# Patient Record
Sex: FEMALE | Race: BLACK OR AFRICAN AMERICAN | URBAN - METROPOLITAN AREA
[De-identification: names, ages, dates, MRNs, and addresses within clinical notes are randomized per-mention and may not be internally consistent; named-entity substitution may affect disease eponyms.]

---

## 2018-01-16 ENCOUNTER — ALLSCRIPTS OFFICE VISIT (OUTPATIENT)
Dept: OTHER | Facility: OTHER | Age: 58
End: 2018-01-16

## 2018-01-17 NOTE — CONSULTS
Assessment   1  Non-smoker (V49 89) (Z78 9)   2  Difficulty in walking (719 7) (R26 2)   3  Closed fracture dislocation of toe of left foot, initial encounter (826 0) (I64 651O)    Plan    · Follow-up visit in 2 weeks Evaluation and Treatment  Follow-up  Status: Hold For -    Scheduling  Requested for: [id number]    Discussion/Summary      [place] splinting ice and elevation  Patient says she has of the take NSAIDs  Has stomach problems  Does take morphine for chronic back pain  Patient had previous cervical spinal fusion has attained joyce was done little over year ago  Discussed with patient injection or physical therapy not resolving  Discussed importance of 1 shoes discussed padding  Patient to call if any increasing pain  The patient was counseled regarding instructions for management,-- patient and family education,-- importance of compliance with treatment  Chief Complaint   11/2017 I got a fracture of left foot  Left foot is swollen  fractured left 5th digit in November  Patient had recent x-ray brought in with her  Patient has some pain and swelling in the toe  Pain when wearing shoes  Patient has not noticed any redness or signs of infection  Patient rates pain 5/10 pain scale  Social History    · Non-smoker (V49 89) (Z78 9)    Current Meds    1  No Reported Medications Recorded     The medication list was reviewed and updated today  Allergies   1  No Known Drug Allergies    Vitals    Recorded: [id number] 01:07PM   Height 5 ft 4 in   Weight 130 lb    BMI Calculated 22 31   BSA Calculated 1 63     Physical Exam   Left Foot: Appearance: Normal except as noted: excessive pronation-- and-- pes planus  Left 5th digit is clinically straight  There is moderate swelling  There is no redness no signs of infection  There is mild pain on range of motion  Right Foot: Appearance: Normal except as noted: excessive pronation-- and-- pes planus  Neurological Exam: performed   Light touch was intact bilaterally  Vibratory sensation was intact bilaterally  Vascular Exam: performed Dorsalis pedis pulses were 2/4 bilaterally  Posterior tibial pulses were 1/4 bilaterally  Capillary refill time was between 1-3 seconds bilaterally  Results/Data   I personally reviewed the films/images/results in the office today  My interpretation follows  X-ray Review X-ray reviewed right foot discussed with patient  Discussed that there is some lucency of the lateral aspect of the distal phalanx possible fracture  There is no fracture in the proximal phalanx the there is no dislocation toes clinically rectus  Procedure   sandra splinting applied to left 5th digit patient instructed on sandra splinting  Signatures    Electronically signed by :  Morena Gardner DPM; Jan 16 2018  1:27PM EST                       (Author)

## 2018-01-23 VITALS — WEIGHT: 130 LBS | HEIGHT: 64 IN | BODY MASS INDEX: 22.2 KG/M2

## 2018-03-14 ENCOUNTER — OFFICE VISIT (OUTPATIENT)
Dept: PODIATRY | Facility: CLINIC | Age: 58
End: 2018-03-14
Payer: COMMERCIAL

## 2018-03-14 VITALS
SYSTOLIC BLOOD PRESSURE: 126 MMHG | DIASTOLIC BLOOD PRESSURE: 76 MMHG | BODY MASS INDEX: 21.85 KG/M2 | HEIGHT: 64 IN | WEIGHT: 128 LBS | RESPIRATION RATE: 16 BRPM | HEART RATE: 80 BPM

## 2018-03-14 DIAGNOSIS — M79.671 PAIN IN BOTH FEET: ICD-10-CM

## 2018-03-14 DIAGNOSIS — M20.42 HAMMER TOE OF LEFT FOOT: ICD-10-CM

## 2018-03-14 DIAGNOSIS — B35.1 ONYCHOMYCOSIS: ICD-10-CM

## 2018-03-14 DIAGNOSIS — M21.969 ACQUIRED DEFORMITY OF FOOT, UNSPECIFIED LATERALITY: Primary | ICD-10-CM

## 2018-03-14 DIAGNOSIS — M79.672 PAIN IN BOTH FEET: ICD-10-CM

## 2018-03-14 PROCEDURE — 73620 X-RAY EXAM OF FOOT: CPT | Performed by: PODIATRIST

## 2018-03-14 PROCEDURE — 99213 OFFICE O/P EST LOW 20 MIN: CPT | Performed by: PODIATRIST

## 2018-03-14 RX ORDER — LEVOTHYROXINE SODIUM 0.05 MG/1
50 TABLET ORAL DAILY
COMMUNITY

## 2018-03-14 RX ORDER — PREGABALIN 100 MG/1
50 CAPSULE ORAL 3 TIMES DAILY
COMMUNITY

## 2018-03-14 RX ORDER — MORPHINE SULFATE 15 MG/1
15 TABLET ORAL EVERY 4 HOURS PRN
COMMUNITY

## 2018-03-14 RX ORDER — ESTRADIOL 0.03 MG/D
1 FILM, EXTENDED RELEASE TRANSDERMAL 2 TIMES WEEKLY
COMMUNITY

## 2018-03-14 RX ORDER — ESTRADIOL 0.07 MG/D
PATCH TRANSDERMAL
COMMUNITY
Start: 2018-01-30

## 2018-03-14 RX ORDER — TERBINAFINE HYDROCHLORIDE 250 MG/1
250 TABLET ORAL DAILY
Qty: 30 TABLET | Refills: 0 | Status: SHIPPED | OUTPATIENT
Start: 2018-03-14 | End: 2018-04-13

## 2018-03-14 RX ORDER — RANITIDINE 150 MG/1
150 CAPSULE ORAL 2 TIMES DAILY
COMMUNITY

## 2018-03-14 RX ORDER — DEXLANSOPRAZOLE 60 MG/1
60 CAPSULE, DELAYED RELEASE ORAL DAILY
COMMUNITY

## 2018-03-14 NOTE — PROGRESS NOTES
Assessment/Plan:  Pain upon ambulation  Early mycosis  Posttraumatic arthritis  Plan  X-rays taken  Patient educated on nail care  We will add terbinafine  She may need arthroplasty  She will increase shoe size  No problem-specific Assessment & Plan notes found for this encounter  There are no diagnoses linked to this encounter  Subjective:      Patient ID: Rolf Austin is a 62 y o  female  Patient has a new problem  Patient is concerned with pain in both big toes  Patient has no history of trauma to each big toe  Of note, patient has pain in her small left toe  She did have injury there several months prior  She has pain with wearing shoes  The following portions of the patient's history were reviewed and updated as appropriate: allergies, current medications, past family history, past medical history, past social history, past surgical history and problem list     Review of Systems    Social History    · Non-smoker (V49 89) (Z78 9)     Current Meds    1  No Reported Medications Recorded     The medication list was reviewed and updated today  Allergies   1  No Known Drug Allergies     Vitals     Recorded: [id number] 01:07PM   Height 5 ft 4 in   Weight 130 lb    BMI Calculated 22 31   BSA Calculated 1 63      Physical Exam   Left Foot: Appearance: Normal except as noted: excessive pronation-- and-- pes planus  Left 5th digit is clinically straight  There is moderate swelling  There is no redness no signs of infection  There is mild pain on range of motion  Right Foot: Appearance: Normal except as noted: excessive pronation-- and-- pes planus  Neurological Exam: performed  Light touch was intact bilaterally  Vibratory sensation was intact bilaterally  Vascular Exam: performed Dorsalis pedis pulses were 2/4 bilaterally  Posterior tibial pulses were 1/4 bilaterally  Capillary refill time was between 1-3 seconds bilaterally  Objective:      Foot Exam    General  General Appearance: appears stated age and healthy   Orientation: alert and oriented to person, place, and time   Affect: appropriate   Gait: unimpaired       Right Foot/Ankle     Inspection and Palpation  Ecchymosis: none  Tenderness: (Hallux proximal nail fold)  Swelling: none   Arch: pes planus  Hallux valgus: yes    Neurovascular  Dorsalis pedis: 3+  Posterior tibial: 3+  Saphenous nerve sensation: normal  Tibial nerve sensation: normal  Superficial peroneal nerve sensation: normal  Deep peroneal nerve sensation: normal  Sural nerve sensation: normal  Achilles reflex: 2+  Babinski reflex: 2+    Comments  Hallux nail bilateral demonstrates wide incurvated toenail  Early mycosis noted  X-ray demonstrates no evidence of fracture or bony mass of hallux bilateral     Left Foot/Ankle      Inspection and Palpation  Ecchymosis: none  Tenderness: (Hallux proximal nail fold)  Swelling: none   Hammertoes: first toe  Hallux valgus: yes    Neurovascular  Dorsalis pedis: 3+  Posterior tibial: 3+  Saphenous nerve sensation: normal  Tibial nerve sensation: normal  Superficial peroneal nerve sensation: normal  Deep peroneal nerve sensation: normal  Sural nerve sensation: normal  Achilles reflex: 2+  Babinski reflex: 2+    Comments  X-ray  X-ray demonstrates enlarged distal phalanx secondary to prior trauma  Physical Exam   Cardiovascular:   Pulses:       Dorsalis pedis pulses are 3+ on the right side, and 3+ on the left side  Posterior tibial pulses are 3+ on the right side, and 3+ on the left side  Neurological:   Reflex Scores:       Achilles reflexes are 2+ on the right side and 2+ on the left side

## 2018-04-18 ENCOUNTER — OFFICE VISIT (OUTPATIENT)
Dept: PODIATRY | Facility: CLINIC | Age: 58
End: 2018-04-18
Payer: COMMERCIAL

## 2018-04-18 VITALS
BODY MASS INDEX: 21.85 KG/M2 | HEIGHT: 64 IN | WEIGHT: 128 LBS | DIASTOLIC BLOOD PRESSURE: 76 MMHG | SYSTOLIC BLOOD PRESSURE: 126 MMHG | HEART RATE: 16 BPM | RESPIRATION RATE: 16 BRPM

## 2018-04-18 DIAGNOSIS — M79.672 PAIN IN BOTH FEET: ICD-10-CM

## 2018-04-18 DIAGNOSIS — M21.962 ACQUIRED DEFORMITY OF LEFT FOOT: Primary | ICD-10-CM

## 2018-04-18 DIAGNOSIS — L03.039 PARONYCHIA OF TOENAIL, UNSPECIFIED LATERALITY: ICD-10-CM

## 2018-04-18 DIAGNOSIS — B35.1 ONYCHOMYCOSIS: ICD-10-CM

## 2018-04-18 DIAGNOSIS — M79.671 PAIN IN BOTH FEET: ICD-10-CM

## 2018-04-18 PROCEDURE — 99213 OFFICE O/P EST LOW 20 MIN: CPT | Performed by: PODIATRIST

## 2018-04-18 RX ORDER — TERBINAFINE HYDROCHLORIDE 250 MG/1
250 TABLET ORAL DAILY
Qty: 30 TABLET | Refills: 0 | Status: SHIPPED | OUTPATIENT
Start: 2018-04-18 | End: 2018-05-18

## 2018-04-18 NOTE — PROGRESS NOTES
Assessment/Plan:  Hammertoe  Mycosis  Pain  Plan  All nails debrided  Refill terbinafine ordered  Patient will consider arthroplasty of small toe    No problem-specific Assessment & Plan notes found for this encounter  Review of [address] [address]   · Non-smoker (V49 89) (Z78 9)     Current Meds    1  No Reported Medications Recorded     The medication list was reviewed and updated today       Allergies   1  No Known Drug Allergies     Vitals        Height 5 ft 4 in   Weight 130 lb    BMI Calculated 22 31   BSA Calculated 1 63      Physical Exam   Left Foot: Appearance: Normal except as noted: excessive pronation-- and-- pes planus  Left 5th digit is clinically straight  There is moderate swelling  There is no redness no signs of infection  There is mild pain on range of motion     Right Foot: Appearance: Normal except as noted: excessive pronation-- and-- pes planus     Neurological Exam: performed  Light touch was intact bilaterally  Vibratory sensation was intact bilaterally     Vascular Exam: performed Dorsalis pedis pulses were 2/4 bilaterally  Posterior tibial pulses were 1/4 bilaterally  Capillary refill time was between 1-3 seconds bilaterally       Objective:      Foot Exam     General  General Appearance: appears stated age and healthy   Orientation: alert and oriented to person, place, and time   Affect: appropriate   Gait: unimpaired         Right Foot/Ankle      Inspection and Palpation  Ecchymosis: none  Tenderness: (Hallux proximal nail fold)  Swelling: none   Arch: pes planus  Hallux valgus: yes     Neurovascular  Dorsalis pedis: 3+  Posterior tibial: 3+  Saphenous nerve sensation: normal  Tibial nerve sensation: normal  Superficial peroneal nerve sensation: normal  Deep peroneal nerve sensation: normal  Sural nerve sensation: normal  Achilles reflex: 2+  Babinski reflex: 2+     Comments  Hallux nail bilateral demonstrates wide incurvated toenail    Early mycosis noted      X-ray demonstrates no evidence of fracture or bony mass of hallux bilateral      Left Foot/Ankle       Inspection and Palpation  Ecchymosis: none  Tenderness: (Hallux proximal nail fold)  Swelling: none   Hammertoes: first toe  Hallux valgus: yes     Neurovascular  Dorsalis pedis: 3+  Posterior tibial: 3+  Saphenous nerve sensation: normal  Tibial nerve sensation: normal  Superficial peroneal nerve sensation: normal  Deep peroneal nerve sensation: normal  Sural nerve sensation: normal  Achilles reflex: 2+  Babinski reflex: 2+     Comments  X-ray  X-ray demonstrates enlarged distal phalanx secondary to prior trauma  Physical Exam   Cardiovascular:   Pulses:       Dorsalis pedis pulses are 3+ on the right side, and 3+ on the left side  Posterior tibial pulses are 3+ on the right side, and 3+ on the left side  Neurological:   Reflex Scores:       Achilles reflexes are 2+ on the right side and 2+ on the left side  There are no diagnoses linked to this encounter  Subjective:      Patient ID: Kelsi España is a 62 y o  female  Patient still has pain in her feet and toes  She did not change shoe size  She is taking medication as prescribed  The following portions of the patient's history were reviewed and updated as appropriate: allergies, current medications, past family history, past medical history, past social history, past surgical history and problem list     Review of Systems      Objective:      Foot ExamPhysical Exam

## 2024-09-05 ENCOUNTER — APPOINTMENT (RX ONLY)
Dept: URBAN - METROPOLITAN AREA CLINIC 184 | Facility: CLINIC | Age: 64
Setting detail: DERMATOLOGY
End: 2024-09-05

## 2024-09-05 DIAGNOSIS — L20.89 OTHER ATOPIC DERMATITIS: ICD-10-CM | Status: INADEQUATELY CONTROLLED

## 2024-09-05 PROCEDURE — 99204 OFFICE O/P NEW MOD 45 MIN: CPT

## 2024-09-05 PROCEDURE — ? PRESCRIPTION

## 2024-09-05 PROCEDURE — ? COUNSELING

## 2024-09-05 PROCEDURE — ? ADDITIONAL NOTES

## 2024-09-05 RX ORDER — TRIAMCINOLONE ACETONIDE 1 MG/G
CREAM TOPICAL BID
Qty: 80 | Refills: 1 | Status: ERX | COMMUNITY
Start: 2024-09-05

## 2024-09-05 RX ORDER — CICLOPIROX OLAMINE 7.7 MG/G
CREAM TOPICAL
Qty: 90 | Refills: 2 | Status: ERX | COMMUNITY
Start: 2024-09-05

## 2024-09-05 RX ADMIN — TRIAMCINOLONE ACETONIDE: 1 CREAM TOPICAL at 00:00

## 2024-09-05 RX ADMIN — CICLOPIROX OLAMINE: 7.7 CREAM TOPICAL at 00:00

## 2024-09-05 ASSESSMENT — LOCATION DETAILED DESCRIPTION DERM
LOCATION DETAILED: LEFT CLAVICULAR SKIN
LOCATION DETAILED: RIGHT CLAVICULAR SKIN

## 2024-09-05 ASSESSMENT — LOCATION SIMPLE DESCRIPTION DERM
LOCATION SIMPLE: RIGHT CLAVICULAR SKIN
LOCATION SIMPLE: LEFT CLAVICULAR SKIN

## 2024-09-05 ASSESSMENT — ITCH NUMERIC RATING SCALE: HOW SEVERE IS YOUR ITCHING?: 6

## 2024-09-05 ASSESSMENT — BSA RASH: BSA RASH: 9

## 2024-09-05 ASSESSMENT — LOCATION ZONE DERM: LOCATION ZONE: TRUNK

## 2024-09-28 ENCOUNTER — APPOINTMENT (RX ONLY)
Dept: URBAN - METROPOLITAN AREA CLINIC 184 | Facility: CLINIC | Age: 64
Setting detail: DERMATOLOGY
End: 2024-09-28

## 2024-09-28 DIAGNOSIS — L20.89 OTHER ATOPIC DERMATITIS: ICD-10-CM | Status: INADEQUATELY CONTROLLED

## 2024-09-28 PROCEDURE — ? ADDITIONAL NOTES

## 2024-09-28 PROCEDURE — 99213 OFFICE O/P EST LOW 20 MIN: CPT

## 2024-09-28 PROCEDURE — ? COUNSELING

## 2024-09-28 ASSESSMENT — LOCATION DETAILED DESCRIPTION DERM
LOCATION DETAILED: LEFT CLAVICULAR SKIN
LOCATION DETAILED: RIGHT CLAVICULAR SKIN

## 2024-09-28 ASSESSMENT — LOCATION SIMPLE DESCRIPTION DERM
LOCATION SIMPLE: RIGHT CLAVICULAR SKIN
LOCATION SIMPLE: LEFT CLAVICULAR SKIN

## 2024-09-28 ASSESSMENT — LOCATION ZONE DERM: LOCATION ZONE: TRUNK

## 2024-09-28 NOTE — PROCEDURE: ADDITIONAL NOTES
Render Risk Assessment In Note?: no
Detail Level: Simple
Additional Notes: 9/5/2024: Channing valdovinos\\n\\n\\nClinically resolved